# Patient Record
Sex: FEMALE | Race: BLACK OR AFRICAN AMERICAN | Employment: FULL TIME | URBAN - METROPOLITAN AREA
[De-identification: names, ages, dates, MRNs, and addresses within clinical notes are randomized per-mention and may not be internally consistent; named-entity substitution may affect disease eponyms.]

---

## 2017-06-12 ENCOUNTER — APPOINTMENT (OUTPATIENT)
Dept: GENERAL RADIOLOGY | Age: 47
End: 2017-06-12
Attending: PHYSICIAN ASSISTANT
Payer: MEDICAID

## 2017-06-12 ENCOUNTER — HOSPITAL ENCOUNTER (EMERGENCY)
Age: 47
Discharge: HOME | End: 2017-06-12
Attending: FAMILY MEDICINE | Admitting: FAMILY MEDICINE
Payer: MEDICAID

## 2017-06-12 VITALS
SYSTOLIC BLOOD PRESSURE: 154 MMHG | DIASTOLIC BLOOD PRESSURE: 92 MMHG | OXYGEN SATURATION: 100 % | BODY MASS INDEX: 32.58 KG/M2 | HEIGHT: 69 IN | RESPIRATION RATE: 18 BRPM | WEIGHT: 220 LBS | TEMPERATURE: 97.8 F | HEART RATE: 59 BPM

## 2017-06-12 DIAGNOSIS — S43.401A SPRAIN OF RIGHT SHOULDER, UNSPECIFIED SHOULDER SPRAIN TYPE, INITIAL ENCOUNTER: Primary | ICD-10-CM

## 2017-06-12 DIAGNOSIS — Z98.890 HISTORY OF ROTATOR CUFF SURGERY: ICD-10-CM

## 2017-06-12 PROCEDURE — L3670 SO ACRO/CLAV CAN WEB PRE OTS: HCPCS

## 2017-06-12 PROCEDURE — 73030 X-RAY EXAM OF SHOULDER: CPT

## 2017-06-12 PROCEDURE — 99283 EMERGENCY DEPT VISIT LOW MDM: CPT

## 2017-06-12 RX ORDER — IBUPROFEN 600 MG/1
600 TABLET ORAL
Qty: 20 TAB | Refills: 0 | Status: SHIPPED | OUTPATIENT
Start: 2017-06-12

## 2017-06-12 RX ORDER — HYDROCODONE BITARTRATE AND ACETAMINOPHEN 5; 325 MG/1; MG/1
1-2 TABLET ORAL
Qty: 16 TAB | Refills: 0 | Status: SHIPPED | OUTPATIENT
Start: 2017-06-12

## 2017-06-12 RX ORDER — CHLORZOXAZONE 500 MG/1
500 TABLET ORAL
Qty: 21 TAB | Refills: 0 | Status: SHIPPED | OUTPATIENT
Start: 2017-06-12

## 2017-06-12 NOTE — ED PROVIDER NOTES
HPI Comments: 12:03 PM    All Light is a 56 y.o. female presenting to the ED C/O right shoulder and right upper back pain onset last PM. Pain rated 9/10. 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 \" The pain is exacerbated by movement. Pt reports a rotator cuff injury to her right shoulder last year for which she had surgery. Pt has allergy to Tramadol and Penicillins. Pt denies any other symptoms or complaints. Pt states this is not a worker's comp. Written by GARY Gamez, as dictated by Blas Bronson PA-C. Patient is a 56 y.o. female presenting with back pain. The history is provided by the patient. No  was used. Back Pain    This is a new problem. The current episode started yesterday. The problem has been gradually worsening. The problem occurs constantly. The pain is associated with twisting. The pain is present in the upper back and right side (Right shoulder). The pain is at a severity of 9/10. Pertinent negatives include no fever, no numbness and no weakness. Past Medical History:   Diagnosis Date    Abdominal bloating     Abdominal pain     Abnormal weight loss     Hypertension     Migraine     Other ill-defined conditions endometriosis       Past Surgical History:   Procedure Laterality Date    BIOPSY OF BREAST, INCISIONAL  1989    right cyst removed    HX CYST REMOVAL      HX MOHS PROCEDURES      HX TUBAL LIGATION           History reviewed. No pertinent family history. Social History     Social History    Marital status:      Spouse name: N/A    Number of children: N/A    Years of education: N/A     Occupational History    Not on file.      Social History Main Topics    Smoking status: Never Smoker    Smokeless tobacco: Not on file    Alcohol use No    Drug use: No    Sexual activity: Not on file     Other Topics Concern    Not on file Social History Narrative         ALLERGIES: Penicillins and Tramadol    Review of Systems   Constitutional: Negative for activity change and fever. Musculoskeletal: Positive for arthralgias and back pain (Right upper). Negative for joint swelling. Right shoulder pain   Skin: Negative for color change. Neurological: Negative for weakness and numbness. Hematological: Negative for adenopathy. Vitals:    06/12/17 1125   BP: (!) 154/92   Pulse: (!) 59   Resp: 18   Temp: 97.8 °F (36.6 °C)   SpO2: 100%   Weight: 99.8 kg (220 lb)   Height: 5' 9\" (1.753 m)            Physical Exam   Constitutional: She is oriented to person, place, and time. She appears well-developed and well-nourished. No distress. AA female in NAD. Alert. Appears comfortable. HENT:   Head: Normocephalic and atraumatic. Right Ear: External ear normal.   Left Ear: External ear normal.   Mouth/Throat: Uvula is midline and mucous membranes are normal. No oral lesions. No trismus in the jaw. No dental abscesses or uvula swelling. No posterior oropharyngeal edema, posterior oropharyngeal erythema or tonsillar abscesses. Eyes: Conjunctivae are normal.   Neck: Normal range of motion. Cardiovascular: Normal rate, regular rhythm, normal heart sounds and intact distal pulses. Exam reveals no gallop and no friction rub. No murmur heard. Pulses:       Radial pulses are 2+ on the right side, and 2+ on the left side. Pulmonary/Chest: Effort normal and breath sounds normal. No accessory muscle usage. No tachypnea. No respiratory distress. She has no decreased breath sounds. She has no wheezes. She has no rhonchi. She has no rales. Abdominal: Soft. Musculoskeletal:        Right shoulder: She exhibits decreased range of motion, tenderness, pain and spasm. She exhibits no swelling and no deformity. Right elbow: She exhibits normal range of motion, no swelling and no effusion. No tenderness found.         Right upper arm: She exhibits no tenderness, no bony tenderness, no swelling and no edema. Right forearm: She exhibits no tenderness, no bony tenderness, no swelling and no edema. Right hand: She exhibits normal range of motion, no tenderness, normal capillary refill, no deformity and no swelling. Normal sensation noted. Normal strength noted. Neurological: She is alert and oriented to person, place, and time. Skin: Skin is warm and dry. She is not diaphoretic. Psychiatric: She has a normal mood and affect. Judgment normal.   Nursing note and vitals reviewed. RESULTS:    XR SHOULDER RT AP/LAT MIN 2 V   Final Result:  No acute abnormality. As read by the radiologist.          12:38 PM  RADIOLOGY FINDINGS  Right shoulder X-ray shows no acute process. Pending review by Radiologist  Recorded by Sylvie Lindsay ED Scribe, as dictated by Pablito Parada PA-C     Labs Reviewed - No data to display    No results found for this or any previous visit (from the past 12 hour(s)). MDM  Number of Diagnoses or Management Options  History of rotator cuff surgery:   Sprain of right shoulder, unspecified shoulder sprain type, initial encounter:   Diagnosis management comments: Sprain, strain, fx, tendonitis, rotator cuff       Amount and/or Complexity of Data Reviewed  Tests in the radiology section of CPT®: ordered and reviewed (XR right shoulder)  Independent visualization of images, tracings, or specimens: yes (XR right shoulder)      ED Course     Medications - No data to display   Procedures      PROGRESS NOTE:  12:03 PM  Initial assessment performed. Imaging unremarkable. NVI. Will tx for R shoulder sprain. Sling with AC precautions reviewed. Work note provided. Reasons to RTED discussed with pt. All questions answered. Pt feels comfortable going home at this time. Pt expressed understanding and she agrees with plan. DISCHARGE NOTE:   12:39 PM  Pt has been reexamined.  Patient has no new complaints, changes, or physical findings. Care plan outlined and precautions discussed. Results were reviewed with the patient. All medications were reviewed with the patient; will d/c home with Norco, Chlorzoxazone, and Motrin All of pt's questions and concerns were addressed. Patient was instructed and agrees to follow up with Ortho, as well as to return to the ED upon further deterioration. Patient is ready to go home. CLINICAL IMPRESSION:    1. Sprain of right shoulder, unspecified shoulder sprain type, initial encounter    2. History of rotator cuff surgery        PLAN: DISCHARGE HOME    Follow-up Information     Follow up With Details Comments Contact Info    Matheus Shrap. Garr Mcardle, MD Schedule an appointment as soon as possible for a visit For orthopedic follow up [address] [place] [id number] [phone]      [facility] EMERGENCY DEPT  As needed, If symptoms worsen [address] [id number]  [phone]          Discharge Medication List as of 6/12/2017 12:45 PM      START taking these medications    Details   HYDROcodone-acetaminophen (NORCO) 5-325 mg per tablet Take 1-2 Tabs by mouth every four (4) hours as needed for Pain. Max Daily Amount: 12 Tabs., Print, Disp-16 Tab, R-0      chlorzoxazone (PARAFON FORTE DSC) 500 mg tablet Take 1 Tab by mouth three (3) times daily as needed for Muscle Spasm(s). , Print, Disp-21 Tab, R-0      ibuprofen (MOTRIN) 600 mg tablet Take 1 Tab by mouth every six (6) hours as needed for Pain. Take with food. , Print, Disp-20 Tab, R-0         STOP taking these medications       naproxen (NAPROSYN) 500 mg tablet Comments:   Reason for Stopping:               ATTESTATION:  This note is prepared by Azeem Waterman, acting as Scribe for Samara Pastrana PA-C. Samara Pastrana PA-C: The scribe's documentation has been prepared under my direction and personally reviewed by me in its entirety.  I confirm that the note above accurately reflects all work, treatment, procedures, and medical decision making performed by me.

## 2017-06-12 NOTE — LETTER
[facility] [place] 
[facility] 
[address] [id number] [phone] Work/School Note Date: 6/12/2017 To Whom It May concern: 
 
Malaika Gutiérrez was seen and treated today in the emergency room by the following provider(s): 
Attending Provider: Chaparro Isabel MD 
Physician Assistant: Steve Black PA-C. Malaika Gutiérrez may return to work on 06/15/2017. Please allow light duty from 06/15/17-06/22/17 with no lifting more than 5 lbs, no lifting at all above the head, no reaching, or crawling. Sincerely, Lázaro Gamboa PA-C

## 2017-06-12 NOTE — ED NOTES
I have reviewed discharge instructions with the patient. The patient verbalized understanding.   Patient armband removed and shredded, scripts x 3 and work note given

## 2017-06-12 NOTE — DISCHARGE INSTRUCTIONS
Rotator Cuff Injury: Care Instructions  Your Care Instructions  The rotator cuff is a group of tendons and muscles around the shoulder that keeps the upper arm bone in place. It keeps the shoulder joint stable and allows you to raise and rotate your arm. Damage to the rotator cuff can be caused by overuse, a fall, or a direct blow to the shoulder area, which can tear the tendons. Over time, everyday wear can damage the tendons and make injury more likely. Treatment can depend upon the amount of damage to the tendons. In a younger person, surgery may be the first choice. But if you are older than 25, you likely have some wear on your rotator cuff. Surgery may not be the most effective treatment. Your doctor may have you try physical therapy and exercise first.  Follow-up care is a key part of your treatment and safety. Be sure to make and go to all appointments, and call your doctor if you are having problems. It's also a good idea to know your test results and keep a list of the medicines you take. How can you care for yourself at home? · Rest your shoulder as much as you can. If your doctor put your arm in a sling or shoulder immobilizer, wear it as directed. Do not take it off before your doctor tells you to. If it is too tight, loosen it. · Be safe with medicines. Read and follow all instructions on the label. ¨ If the doctor gave you a prescription medicine for pain, take it as prescribed. ¨ If you are not taking a prescription pain medicine, ask your doctor if you can take an over-the-counter medicine. · Put ice or a cold pack on your shoulder for 10 to 20 minutes at a time. Try to do this every 1 to 2 hours for the next 3 days (when you are awake). Put a thin cloth between the ice pack and your skin. · After 3 days, put a warm, wet towel on your shoulder. This is to relax the muscles and help blood flow.   · While holding a warm, wet towel on your shoulder, lean forward so your arm hangs freely, and gently swing your arm back and forth like a pendulum. You also can do this standing under a warm shower. · Do not do anything that makes your pain worse. · Follow your doctor's advice about whether you need physical therapy. When should you call for help? Call your doctor now or seek immediate medical care if:  · You have severe pain. · You cannot move your shoulder or arm. · You have tingling or numbness in your arm or hand. · Your arm or hand is cool or pale. Watch closely for changes in your health, and be sure to contact your doctor if:  · Your pain gets worse. · You have new or worse swelling in your arm or hand. · You do not get better as expected. Where can you learn more? Go to http://palak-ambika.info/. Enter [phone] in the search box to learn more about \"Rotator Cuff Injury: Care Instructions. \"  Current as of: May 23, 2016  Content Version: 11.2  © 3555-2764 [employer], [employer]. Care instructions adapted under license by [employer] (which disclaims liability or warranty for this information). If you have questions about a medical condition or this instruction, always ask your healthcare professional. [place] [address] any warranty or liability for your use of this information.

## 2017-06-12 NOTE — ED TRIAGE NOTES
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 Pt  was dropped off at ER today.